# Patient Record
Sex: MALE | Race: WHITE | Employment: FULL TIME | URBAN - METROPOLITAN AREA
[De-identification: names, ages, dates, MRNs, and addresses within clinical notes are randomized per-mention and may not be internally consistent; named-entity substitution may affect disease eponyms.]

---

## 2021-10-05 ENCOUNTER — APPOINTMENT (OUTPATIENT)
Dept: GENERAL RADIOLOGY | Age: 46
End: 2021-10-05
Attending: EMERGENCY MEDICINE
Payer: COMMERCIAL

## 2021-10-05 ENCOUNTER — HOSPITAL ENCOUNTER (EMERGENCY)
Age: 46
Discharge: HOME | End: 2021-10-05
Attending: EMERGENCY MEDICINE
Payer: COMMERCIAL

## 2021-10-05 VITALS
BODY MASS INDEX: 31.15 KG/M2 | HEIGHT: 72 IN | SYSTOLIC BLOOD PRESSURE: 136 MMHG | TEMPERATURE: 98 F | DIASTOLIC BLOOD PRESSURE: 86 MMHG | RESPIRATION RATE: 17 BRPM | WEIGHT: 230 LBS | OXYGEN SATURATION: 93 %

## 2021-10-05 DIAGNOSIS — S90.32XA CONTUSION OF LEFT FOOT, INITIAL ENCOUNTER: Primary | ICD-10-CM

## 2021-10-05 PROCEDURE — 74011250637 HC RX REV CODE- 250/637: Performed by: EMERGENCY MEDICINE

## 2021-10-05 PROCEDURE — 73630 X-RAY EXAM OF FOOT: CPT

## 2021-10-05 PROCEDURE — 99284 EMERGENCY DEPT VISIT MOD MDM: CPT

## 2021-10-05 RX ORDER — IBUPROFEN 400 MG/1
800 TABLET ORAL
Status: COMPLETED | OUTPATIENT
Start: 2021-10-05 | End: 2021-10-05

## 2021-10-05 RX ADMIN — IBUPROFEN 800 MG: 400 TABLET, FILM COATED ORAL at 20:48

## 2021-10-05 NOTE — ED TRIAGE NOTES
Left foot pain during takedown in [facility] another persons knee came into contact with left medial foot proximal to great toe

## 2021-10-06 NOTE — ED PROVIDER NOTES
History of chronic low back pain, GERD. He presents with left foot pain after injuring it while doing [email or web address]. He states his left medial foot came into contact with his opponent's knee approximately 1 hour ago. He has had difficulty weightbearing since then. The pain is moderate and worse with movement/weightbearing. Ice prior to arrival with limited relief. No other injuries or complaints. Past Medical History:   Diagnosis Date    Chronic pain     LOWER BACK    GERD (gastroesophageal reflux disease)     Other ill-defined conditions(799.89)     HEMORRHOIDS       Past Surgical History:   Procedure Laterality Date    HX HERNIA REPAIR      INGUINAL    HX LUMBAR LAMINECTOMY      HX UROLOGICAL      VARICOSECELE         Family History:   Problem Relation Age of Onset    Cancer Mother         LEUKIMIA AND NON-HODGKINS LYMPHOMA    Diabetes Father         BORDERLINE    Emphysema Maternal Grandmother     Heart Attack Maternal Grandmother     Heart Disease Maternal Grandmother     Heart Disease Maternal Grandfather     Diabetes Paternal Grandmother     Heart Disease Paternal Grandfather     Heart Surgery Paternal Grandfather        Social History     Socioeconomic History    Marital status:      Spouse name: Not on file    Number of children: Not on file    Years of education: Not on file    Highest education level: Not on file   Occupational History    Not on file   Tobacco Use    Smoking status: Former Smoker     Packs/day: 0.50     Years: 10.00     Pack years: 5.00     Quit date: 2006     Years since quittin.8    Smokeless tobacco: Never Used   Substance and Sexual Activity    Alcohol use:  Yes     Alcohol/week: 3.3 standard drinks     Types: 2 Glasses of wine, 2 Cans of beer per week    Drug use: Yes     Types: Marijuana     Comment: AVERAGE 1 X A DAY    Sexual activity: Not on file   Other Topics Concern    Not on file   Social History Narrative    Not on file     Social Determinants of Health     Financial Resource Strain:     Difficulty of Paying Living Expenses:    Food Insecurity:     Worried About Running Out of Food in the Last Year:     [address] in the Last Year:    Transportation Needs:     Lack of Transportation (Medical):  Lack of Transportation (Non-Medical):    Physical Activity:     Days of Exercise per Week:     Minutes of Exercise per Session:    Stress:     Feeling of Stress :    Social Connections:     Frequency of Communication with Friends and Family:     Frequency of Social Gatherings with Friends and Family:     Attends Faith Services:     Active Member of Clubs or Organizations:     Attends Club or Organization Meetings:     Marital Status:    Intimate Partner Violence:     Fear of Current or Ex-Partner:     Emotionally Abused:     Physically Abused:     Sexually Abused: ALLERGIES: Patient has no known allergies. Review of Systems   All other systems reviewed and are negative. Vitals:    10/05/21 1958   BP: (!) 141/86   Resp: 17   Temp: 98 °F (36.7 °C)   Weight: 104.3 kg (230 lb)   Height: 6' (1.829 m)            Physical Exam  Vitals and nursing note reviewed. Constitutional:       Appearance: He is well-developed. HENT:      Head: Normocephalic and atraumatic. Eyes:      Conjunctiva/sclera: Conjunctivae normal.   Neck:      Trachea: No tracheal deviation. Cardiovascular:      Rate and Rhythm: Normal rate. Pulmonary:      Effort: Pulmonary effort is normal.   Abdominal:      General: There is no distension. Musculoskeletal:      Comments: Left medial foot tenderness. 2+ pedal pulses. Skin:     General: Skin is dry. Neurological:      Mental Status: He is alert. MDM       Procedures    Progress Note:  Results, treatment, and follow up plan have been discussed with patient. Questions were answered.    Jabier Palacio MD  8:42 PM    Assessment/plan: Left foot injury -suspect contusion; reassuring appearance/exam with stable vital signs. X-rays negative for fracture. Home with Ace wrap/crutches. Recommendations of rest, ice, elevation, ibuprofen. Ortho follow-up as needed. Return precautions discussed.   Gokul Wagner MD  8:43 PM

## 2022-02-18 ENCOUNTER — OFFICE VISIT (OUTPATIENT)
Dept: ORTHOPEDIC SURGERY | Age: 47
End: 2022-02-18
Payer: COMMERCIAL

## 2022-02-18 VITALS — WEIGHT: 230 LBS | BODY MASS INDEX: 31.15 KG/M2 | HEIGHT: 72 IN

## 2022-02-18 DIAGNOSIS — M25.561 ACUTE PAIN OF RIGHT KNEE: Primary | ICD-10-CM

## 2022-02-18 DIAGNOSIS — S83.411A COMPLETE TEAR OF MCL OF KNEE, RIGHT, INITIAL ENCOUNTER: ICD-10-CM

## 2022-02-18 PROCEDURE — 99204 OFFICE O/P NEW MOD 45 MIN: CPT | Performed by: ORTHOPAEDIC SURGERY

## 2022-02-18 PROCEDURE — L1833 KO ADJ JNT POS R SUP PRE OTS: HCPCS | Performed by: ORTHOPAEDIC SURGERY

## 2022-02-18 RX ORDER — TRAMADOL HYDROCHLORIDE 50 MG/1
50 TABLET ORAL
Qty: 42 TABLET | Refills: 0 | Status: SHIPPED | OUTPATIENT
Start: 2022-02-18 | End: 2022-02-25

## 2022-02-18 NOTE — PROGRESS NOTES
ASSESSMENT/PLAN:  Below is the assessment and plan developed based on review of pertinent history, physical exam, labs, studies, and medications. 1. Acute pain of right knee  -     XR KNEE RT MIN 4 V; Future  2. Tear of medial meniscus of right knee, current, unspecified tear type, initial encounter      No follow-ups on file. 79-year-old male with symptoms of a right knee MCL tear. Due to the nature of his injury and his acute symptoms, I would like to obtain an MRI to further evaluate the pathology in his knee. He will follow up with us once the imaging is obtained. Given that the patient's symptoms are increasing in frequency and duration, we have decided to evaluate the etiology of the pain and loss of function with an MRI. We discussed the risks of an MRI which include, but are not limited to the enclosed space, noisy environment, magnetic effect on implanted metal. We also talked about the fact that MRI is also contraindicated in the presence of internal metallic objects such as bullets or shrapnel, as well as surgical clips, pins, plates, screws, metal sutures, or wire mesh. We talked about the fact that MRI does not use radiation, but it may be contraindicated if the patient has implanted pacemakers, intracranial aneurysm clips, cochlear implants, certain prosthetic devices, implanted drug infusion pumps, neurostimulators, bone-growth stimulators, certain intrauterine contraceptive devices; or any other type of iron-based metal implants. We discussed the fact that if you are pregnant or suspect that you may be pregnant, you should notify your physician and consult with your primary care or obstetrician before having an MRI. Although rare, we talked about the fact that if contrast dye is used, there is a risk for allergic reaction to the dye.  Patients who are allergic to or sensitive to medications, contrast dye, iodine, or shellfish should notify the radiologist or technologist prior to the administration of dye. MRI contrast may also influence other conditions such as allergies, asthma, anemia, hypotension (low blood pressure), and sickle cell disease. The patient has expressed understanding of these risks and I will see the patient back after the MRI to discuss the findings as well as the treatment options. SUBJECTIVE/OBJECTIVE:  Ellie Iqbal (: 1975) is a 51 y.o. male, patient,here for evaluation of the Knee Pain (right knee)  . 58-year-old male presenting today complaining of right knee pain. He states that last night he was doing jujitsu when he felt a pop in the medial aspect of his knee and immediate pain. He was able to bear weight following the injury with some discomfort. He did not notice much swelling in the joint. He has trouble fully extending his knee as well as fully flexing. His pain is worsened with bearing weight. He is currently ambulating with crutches. He denies any radiation of pain or any numbness or tingling in the leg. He is not taking any medication for his symptoms at this time. Physical Exam    Upon examination of the right knee, the patient is tender to palpation along the medial joint line and femoral origin of the MCL, and has some soft tissue swelling medially. There is a small effusion. The patient has discomfort with Catrachita´s maneuvers as well as stability testing, and I cannot elicit an endpoint to the MCL with valgus stress testing. They lack full motion secondary to discomfort and swelling. They have 5/5 strength, and are neurovascularly intact distally. There is no erythema, warmth or skin lesions present. Imaging:    A/P, lateral, tunnel and sunrise views of the right knee were reviewed in the office today and demonstrated no periosteal reaction, no medullary lesions, no osteopenia, well aligned joint spaces and no chondrolysis. No Known Allergies    No current outpatient medications on file.      No current facility-administered medications for this visit. Past Medical History:   Diagnosis Date    Chronic pain     LOWER BACK    GERD (gastroesophageal reflux disease)     Other ill-defined conditions(799.89)     HEMORRHOIDS       Past Surgical History:   Procedure Laterality Date    HX HERNIA REPAIR  1977    INGUINAL    HX LUMBAR LAMINECTOMY  2004    HX UROLOGICAL      VARICOSECELE       Family History   Problem Relation Age of Onset    Cancer Mother         LEUKIMIA AND NON-HODGKINS LYMPHOMA    Diabetes Father         BORDERLINE    Emphysema Maternal Grandmother     Heart Attack Maternal Grandmother     Heart Disease Maternal Grandmother     Heart Disease Maternal Grandfather     Diabetes Paternal Grandmother     Heart Disease Paternal Grandfather     Heart Surgery Paternal Grandfather        Social History     Socioeconomic History    Marital status:      Spouse name: Not on file    Number of children: Not on file    Years of education: Not on file    Highest education level: Not on file   Occupational History    Not on file   Tobacco Use    Smoking status: Former Smoker     Packs/day: 0.50     Years: 10.00     Pack years: 5.00     Quit date: 11/22/2006     Years since quitting: 15.2    Smokeless tobacco: Never Used   Vaping Use    Vaping Use: Never used   Substance and Sexual Activity    Alcohol use: Yes     Alcohol/week: 3.3 standard drinks     Types: 2 Glasses of wine, 2 Cans of beer per week    Drug use: Yes     Types: Marijuana     Comment: AVERAGE 1 X A DAY    Sexual activity: Not on file   Other Topics Concern    Not on file   Social History Narrative    Not on file     Social Determinants of Health     Financial Resource Strain:     Difficulty of Paying Living Expenses: Not on file   Food Insecurity:     Worried About Running Out of Food in the Last Year: Not on file    Kaylee of Food in the Last Year: Not on file   Transportation Needs:     Lack of Transportation (Medical):  Not on file  Lack of Transportation (Non-Medical): Not on file   Physical Activity:     Days of Exercise per Week: Not on file    Minutes of Exercise per Session: Not on file   Stress:     Feeling of Stress : Not on file   Social Connections:     Frequency of Communication with Friends and Family: Not on file    Frequency of Social Gatherings with Friends and Family: Not on file    Attends Islam Services: Not on file    Active Member of [address] [place] [address] or Organizations: Not on file    Attends Club or Organization Meetings: Not on file    Marital Status: Not on file   Intimate Partner Violence:     Fear of Current or Ex-Partner: Not on file    Emotionally Abused: Not on file    Physically Abused: Not on file    Sexually Abused: Not on file   Housing Stability:     Unable to Pay for Housing in the Last Year: Not on file    Number of [place] in the Last Year: Not on file    Unstable Housing in the Last Year: Not on file       Review of Systems    No flowsheet data found. Vitals:  Ht 6' (1.829 m)   Wt 230 lb (104.3 kg)   BMI 31.19 kg/m²    Body mass index is 31.19 kg/m². An electronic signature was used to authenticate this note.   -- Javi Victor PA-C

## 2022-03-01 PROBLEM — S83.411A COMPLETE TEAR OF MCL OF KNEE, RIGHT, INITIAL ENCOUNTER: Status: ACTIVE | Noted: 2022-03-01

## 2022-03-02 ENCOUNTER — OFFICE VISIT (OUTPATIENT)
Dept: ORTHOPEDIC SURGERY | Age: 47
End: 2022-03-02
Payer: COMMERCIAL

## 2022-03-02 VITALS — BODY MASS INDEX: 31.15 KG/M2 | HEIGHT: 72 IN | WEIGHT: 230 LBS

## 2022-03-02 DIAGNOSIS — S83.411A COMPLETE TEAR OF MCL OF KNEE, RIGHT, INITIAL ENCOUNTER: Primary | ICD-10-CM

## 2022-03-02 PROCEDURE — 99214 OFFICE O/P EST MOD 30 MIN: CPT | Performed by: ORTHOPAEDIC SURGERY

## 2022-03-02 NOTE — PROGRESS NOTES
ASSESSMENT/PLAN:  Below is the assessment and plan developed based on review of pertinent history, physical exam, labs, studies, and medications. 1. Complete tear of MCL of knee, right, initial encounter      Return in about 4 weeks (around 3/30/2022). In discussion with the patient, we considered the numerus possible diagnoses that could be contributing to their present symptoms. We also deliberated on the extensive management options that must be considered to treat their current condition. We reviewed their accessible prior medical records, diagnostic tests, and current health and employment information. We considered how these symptoms were affecting the patient´s activities of daily living as well as employment and fitness activities. The patient had various questions regarding the possible risks, benefits, complications, morbidity and mortality regarding their diagnosis and treatment options. The patients´ comorbidities were considered, and I advocated that they consider maximizing lifestyle modification through nutrition and exercise to aid in addressing their symptoms. Shared decision making yielded an understanding to move forward with conservation treatment preferences. The patient expressed understanding that if conservative management fails to alleviate the present symptoms they will return to office for re-evaluation and consideration of additional diagnostic tests and potential surgical options. In the interim, we have recommended ice, elevation, and anti-inflammatory medications along with a physician directed home exercise program. We discussed the risks and common side effects of anti-inflammatory medications and instructed the patient to discontinue the medication and contact us if they experienced any side effects. The patient was encouraged to discuss the possible side effects with their family physician or pharmacist prior to initiating any new medications.     We talked about the fact that I would recommend brace wear anytime he is ambulatory. We talked about safe exercises. We talked about the lengthy recovery of healing. I will see him back in 1 month's time. We talked to the fact that he had a proximal MCL tear that should heal with bracing without surgery. Imaging:    I independently reviewed the images and report. MRI KNEE RT WO CONT  Narrative: EXAM: MRI KNEE RT WO CONT    INDICATION: Pain    COMPARISON: Radiographs 2/18/2022    TECHNIQUE: Axial T2 fat-saturated and proton density fat-saturated; coronal T1  and proton density fat-saturated; and sagittal T2 fat-saturated, proton density  fat-saturated, and gradient echo MRI of the right knee . CONTRAST: None. FINDINGS: Bone marrow: Within normal limits. No acute fracture, dislocation, or  marrow replacing process. Joint fluid: Knee joint fluid volume within normal limits. Small Baker's cyst.  Moderately prominent subcutaneous edema at the knee, wrist anteriorly and  anteromedially. Collateral ligaments and posterior, lateral corner: Edema along the course of  the MCL with intact appearing superficial component and partly disrupted  proximal deep component. Fibular collateral ligament and coronal ligaments  appear intact. Medial meniscus: Intact. Lateral meniscus: Intact. ACL and PCL: Intact. Tendons: Intact. Muscles: Within normal limits. Patellofemoral alignment: No patellar subluxation/tilt. Trochlear groove is not  hypoplastic. TT-TG distance: Normal.    Articular cartilage: No chondral or osteochondral derangement demonstrated. Soft tissue mass: None. Impression: Proximal deep component tear of MCL. No meniscal, chondral, or other ligamentous  derangement demonstrated. ASSESSMENT/PLAN:  Below is the assessment and plan developed based on review of pertinent history, physical exam, labs, studies, and medications.     1. Acute pain of right knee  -     XR KNEE RT MIN 4 V; Future  2. Tear of medial meniscus of right knee, current, unspecified tear type, initial encounter      No follow-ups on file. 48-year-old male with symptoms of a right knee MCL tear. Due to the nature of his injury and his acute symptoms, I would like to obtain an MRI to further evaluate the pathology in his knee. He will follow up with us once the imaging is obtained. Given that the patient's symptoms are increasing in frequency and duration, we have decided to evaluate the etiology of the pain and loss of function with an MRI. We discussed the risks of an MRI which include, but are not limited to the enclosed space, noisy environment, magnetic effect on implanted metal. We also talked about the fact that MRI is also contraindicated in the presence of internal metallic objects such as bullets or shrapnel, as well as surgical clips, pins, plates, screws, metal sutures, or wire mesh. We talked about the fact that MRI does not use radiation, but it may be contraindicated if the patient has implanted pacemakers, intracranial aneurysm clips, cochlear implants, certain prosthetic devices, implanted drug infusion pumps, neurostimulators, bone-growth stimulators, certain intrauterine contraceptive devices; or any other type of iron-based metal implants. We discussed the fact that if you are pregnant or suspect that you may be pregnant, you should notify your physician and consult with your primary care or obstetrician before having an MRI. Although rare, we talked about the fact that if contrast dye is used, there is a risk for allergic reaction to the dye. Patients who are allergic to or sensitive to medications, contrast dye, iodine, or shellfish should notify the radiologist or technologist prior to the administration of dye. MRI contrast may also influence other conditions such as allergies, asthma, anemia, hypotension (low blood pressure), and sickle cell disease.  The patient has expressed understanding of these risks and I will see the patient back after the MRI to discuss the findings as well as the treatment options. SUBJECTIVE/OBJECTIVE:  Renelle Simmonds (: 1975) is a 51 y.o. male, patient,here for evaluation of the Knee Pain (right knee)  . 65-year-old male presenting today complaining of right knee pain. He states that last night he was doing jujitsu when he felt a pop in the medial aspect of his knee and immediate pain. He was able to bear weight following the injury with some discomfort. He did not notice much swelling in the joint. He has trouble fully extending his knee as well as fully flexing. His pain is worsened with bearing weight. He is currently ambulating with crutches. He denies any radiation of pain or any numbness or tingling in the leg. He is not taking any medication for his symptoms at this time. Physical Exam    Upon examination of the right knee, the patient is tender to palpation along the medial joint line and femoral origin of the MCL, and has some soft tissue swelling medially. There is a small effusion. The patient has discomfort with Catrachita´s maneuvers as well as stability testing, and I cannot elicit an endpoint to the MCL with valgus stress testing. They lack full motion secondary to discomfort and swelling. They have 5/5 strength, and are neurovascularly intact distally. There is no erythema, warmth or skin lesions present. Imaging:    A/P, lateral, tunnel and sunrise views of the right knee were reviewed in the office today and demonstrated no periosteal reaction, no medullary lesions, no osteopenia, well aligned joint spaces and no chondrolysis. No Known Allergies    No current outpatient medications on file. No current facility-administered medications for this visit.        Past Medical History:   Diagnosis Date    Chronic pain     LOWER BACK    GERD (gastroesophageal reflux disease)     Other ill-defined conditions(674.09)     HEMORRHOIDS Past Surgical History:   Procedure Laterality Date    HX HERNIA REPAIR  1977    INGUINAL    HX LUMBAR LAMINECTOMY  2004    HX UROLOGICAL      VARICOSECELE       Family History   Problem Relation Age of Onset    Cancer Mother         LEUKIMIA AND NON-HODGKINS LYMPHOMA    Diabetes Father         BORDERLINE    Emphysema Maternal Grandmother     Heart Attack Maternal Grandmother     Heart Disease Maternal Grandmother     Heart Disease Maternal Grandfather     Diabetes Paternal Grandmother     Heart Disease Paternal Grandfather     Heart Surgery Paternal Grandfather        Social History     Socioeconomic History    Marital status:      Spouse name: Not on file    Number of children: Not on file    Years of education: Not on file    Highest education level: Not on file   Occupational History    Not on file   Tobacco Use    Smoking status: Former Smoker     Packs/day: 0.50     Years: 10.00     Pack years: 5.00     Quit date: 11/22/2006     Years since quitting: 15.2    Smokeless tobacco: Never Used   Vaping Use    Vaping Use: Never used   Substance and Sexual Activity    Alcohol use: Yes     Alcohol/week: 3.3 standard drinks     Types: 2 Glasses of wine, 2 Cans of beer per week    Drug use: Yes     Types: Marijuana     Comment: AVERAGE 1 X A DAY    Sexual activity: Not on file   Other Topics Concern    Not on file   Social History Narrative    Not on file     Social Determinants of Health     Financial Resource Strain:     Difficulty of Paying Living Expenses: Not on file   Food Insecurity:     Worried About Running Out of Food in the Last Year: Not on file    Kaylee of Food in the Last Year: Not on file   Transportation Needs:     Lack of Transportation (Medical): Not on file    Lack of Transportation (Non-Medical):  Not on file   Physical Activity:     Days of Exercise per Week: Not on file    Minutes of Exercise per Session: Not on file   Stress:     Feeling of Stress : Not on file   Social Connections:     Frequency of Communication with Friends and Family: Not on file    Frequency of Social Gatherings with Friends and Family: Not on file    Attends Bahai Services: Not on file    Active Member of Clubs or Organizations: Not on file    Attends Club or Organization Meetings: Not on file    Marital Status: Not on file   Intimate Partner Violence:     Fear of Current or Ex-Partner: Not on file    Emotionally Abused: Not on file    Physically Abused: Not on file    Sexually Abused: Not on file   Housing Stability:     Unable to Pay for Housing in the Last Year: Not on file    Number of [place] in the Last Year: Not on file    Unstable Housing in the Last Year: Not on file       Review of Systems    No flowsheet data found. Vitals:  Ht 6' (1.829 m)   Wt 230 lb (104.3 kg)   BMI 31.19 kg/m²    Body mass index is 31.19 kg/m². An electronic signature was used to authenticate this note.   -- Reynaldo Cabello PA-C   .

## 2022-04-05 NOTE — PROGRESS NOTES
ASSESSMENT/PLAN:  Below is the assessment and plan developed based on review of pertinent history, physical exam, labs, studies, and medications. 1. Complete tear of MCL of knee, right, initial encounter      Return in about 4 weeks (around 3/30/2022). In discussion with the patient, we considered the numerus possible diagnoses that could be contributing to their present symptoms. We also deliberated on the extensive management options that must be considered to treat their current condition. We reviewed their accessible prior medical records, diagnostic tests, and current health and employment information. We considered how these symptoms were affecting the patient´s activities of daily living as well as employment and fitness activities. The patient had various questions regarding the possible risks, benefits, complications, morbidity and mortality regarding their diagnosis and treatment options. The patients´ comorbidities were considered, and I advocated that they consider maximizing lifestyle modification through nutrition and exercise to aid in addressing their symptoms. Shared decision making yielded an understanding to move forward with conservation treatment preferences. The patient expressed understanding that if conservative management fails to alleviate the present symptoms they will return to office for re-evaluation and consideration of additional diagnostic tests and potential surgical options. In the interim, we have recommended ice, elevation, and anti-inflammatory medications along with a physician directed home exercise program. We discussed the risks and common side effects of anti-inflammatory medications and instructed the patient to discontinue the medication and contact us if they experienced any side effects. The patient was encouraged to discuss the possible side effects with their family physician or pharmacist prior to initiating any new medications.     Given that the patient's symptoms are increasing in frequency and duration we have decided to prescribe physical therapy. We talked about the fact that the goal of physical therapy is for the therapist to assist in developing a program to help return the patient to full strength, function and mobility and decrease pain. We also discussed that the therapist may combine several techniques to help decrease pain. These include but are not limited to stretching, balance exercises, strength training, massage, cold and heat therapy, and electrical stimulation. Although, physical therapy is generally safe, we went over the potential risks to include the worsening of pre-existing conditions, continued pain and no improvement in flexibility, mobility, and strength. We will have the patient follow up after physical therapy to closely monitor their progress. We talked about following up sooner if therapy is not progressing on a weekly basis. We talked about the fact that his MCL was healing well but he need to work on extension of his knee. We will start some physical therapy. I will see him back in 1 month's time. Imaging:    I independently reviewed the images and report. MRI KNEE RT WO CONT  Narrative: EXAM: MRI KNEE RT WO CONT    INDICATION: Pain    COMPARISON: Radiographs 2/18/2022    TECHNIQUE: Axial T2 fat-saturated and proton density fat-saturated; coronal T1  and proton density fat-saturated; and sagittal T2 fat-saturated, proton density  fat-saturated, and gradient echo MRI of the right knee . CONTRAST: None. FINDINGS: Bone marrow: Within normal limits. No acute fracture, dislocation, or  marrow replacing process. Joint fluid: Knee joint fluid volume within normal limits. Small Baker's cyst.  Moderately prominent subcutaneous edema at the knee, wrist anteriorly and  anteromedially.      Collateral ligaments and posterior, lateral corner: Edema along the course of  the MCL with intact appearing superficial component and partly disrupted  proximal deep component. Fibular collateral ligament and coronal ligaments  appear intact. Medial meniscus: Intact. Lateral meniscus: Intact. ACL and PCL: Intact. Tendons: Intact. Muscles: Within normal limits. Patellofemoral alignment: No patellar subluxation/tilt. Trochlear groove is not  hypoplastic. TT-TG distance: Normal.    Articular cartilage: No chondral or osteochondral derangement demonstrated. Soft tissue mass: None. Impression: Proximal deep component tear of MCL. No meniscal, chondral, or other ligamentous  derangement demonstrated. ASSESSMENT/PLAN:  Below is the assessment and plan developed based on review of pertinent history, physical exam, labs, studies, and medications. 1. Acute pain of right knee  -     XR KNEE RT MIN 4 V; Future  2. Tear of medial meniscus of right knee, current, unspecified tear type, initial encounter      No follow-ups on file. 58-year-old male with symptoms of a right knee MCL tear. Due to the nature of his injury and his acute symptoms, I would like to obtain an MRI to further evaluate the pathology in his knee. He will follow up with us once the imaging is obtained. Given that the patient's symptoms are increasing in frequency and duration, we have decided to evaluate the etiology of the pain and loss of function with an MRI. We discussed the risks of an MRI which include, but are not limited to the enclosed space, noisy environment, magnetic effect on implanted metal. We also talked about the fact that MRI is also contraindicated in the presence of internal metallic objects such as bullets or shrapnel, as well as surgical clips, pins, plates, screws, metal sutures, or wire mesh.  We talked about the fact that MRI does not use radiation, but it may be contraindicated if the patient has implanted pacemakers, intracranial aneurysm clips, cochlear implants, certain prosthetic devices, implanted drug infusion pumps, neurostimulators, bone-growth stimulators, certain intrauterine contraceptive devices; or any other type of iron-based metal implants. We discussed the fact that if you are pregnant or suspect that you may be pregnant, you should notify your physician and consult with your primary care or obstetrician before having an MRI. Although rare, we talked about the fact that if contrast dye is used, there is a risk for allergic reaction to the dye. Patients who are allergic to or sensitive to medications, contrast dye, iodine, or shellfish should notify the radiologist or technologist prior to the administration of dye. MRI contrast may also influence other conditions such as allergies, asthma, anemia, hypotension (low blood pressure), and sickle cell disease. The patient has expressed understanding of these risks and I will see the patient back after the MRI to discuss the findings as well as the treatment options. SUBJECTIVE/OBJECTIVE:  Phyllis Woody (: 1975) is a 51 y.o. male, patient,here for evaluation of the Knee Pain (right knee)  . 54-year-old male presenting today complaining of right knee pain. He states that last night he was doing jujitsu when he felt a pop in the medial aspect of his knee and immediate pain. He was able to bear weight following the injury with some discomfort. He did not notice much swelling in the joint. He has trouble fully extending his knee as well as fully flexing. His pain is worsened with bearing weight. He is currently ambulating with crutches. He denies any radiation of pain or any numbness or tingling in the leg. He is not taking any medication for his symptoms at this time. Physical Exam    Upon examination of the right knee, the patient is tender to palpation along the medial joint line and femoral origin of the MCL, and has some soft tissue swelling medially. There is a small effusion.  The patient has discomfort with Catrachita´s maneuvers as well as stability testing, and I cannot elicit an endpoint to the MCL with valgus stress testing. They lack full motion secondary to discomfort and swelling. They have 5/5 strength, and are neurovascularly intact distally. There is no erythema, warmth or skin lesions present. Imaging:    A/P, lateral, tunnel and sunrise views of the right knee were reviewed in the office today and demonstrated no periosteal reaction, no medullary lesions, no osteopenia, well aligned joint spaces and no chondrolysis. No Known Allergies    No current outpatient medications on file. No current facility-administered medications for this visit.        Past Medical History:   Diagnosis Date    Chronic pain     LOWER BACK    GERD (gastroesophageal reflux disease)     Other ill-defined conditions(799.89)     HEMORRHOIDS       Past Surgical History:   Procedure Laterality Date    HX HERNIA REPAIR  1977    INGUINAL    HX LUMBAR LAMINECTOMY  2004    HX UROLOGICAL      VARICOSECELE       Family History   Problem Relation Age of Onset    Cancer Mother         LEUKIMIA AND NON-HODGKINS LYMPHOMA    Diabetes Father         BORDERLINE    Emphysema Maternal Grandmother     Heart Attack Maternal Grandmother     Heart Disease Maternal Grandmother     Heart Disease Maternal Grandfather     Diabetes Paternal Grandmother     Heart Disease Paternal Grandfather     Heart Surgery Paternal Grandfather        Social History     Socioeconomic History    Marital status:      Spouse name: Not on file    Number of children: Not on file    Years of education: Not on file    Highest education level: Not on file   Occupational History    Not on file   Tobacco Use    Smoking status: Former Smoker     Packs/day: 0.50     Years: 10.00     Pack years: 5.00     Quit date: 11/22/2006     Years since quitting: 15.2    Smokeless tobacco: Never Used   Vaping Use    Vaping Use: Never used   Substance and Sexual Activity    Alcohol use: Yes     Alcohol/week: 3.3 standard drinks     Types: 2 Glasses of wine, 2 Cans of beer per week    Drug use: Yes     Types: Marijuana     Comment: AVERAGE 1 X A DAY    Sexual activity: Not on file   Other Topics Concern    Not on file   Social History Narrative    Not on file     Social Determinants of Health     Financial Resource Strain:     Difficulty of Paying Living Expenses: Not on file   Food Insecurity:     Worried About Running Out of Food in the Last Year: Not on file    Kaylee of Food in the Last Year: Not on file   Transportation Needs:     Lack of Transportation (Medical): Not on file    Lack of Transportation (Non-Medical): Not on file   Physical Activity:     Days of Exercise per Week: Not on file    Minutes of Exercise per Session: Not on file   Stress:     Feeling of Stress : Not on file   Social Connections:     Frequency of Communication with Friends and Family: Not on file    Frequency of Social Gatherings with Friends and Family: Not on file    Attends Voodoo Services: Not on file    Active Member of [address] [place] [address] or Organizations: Not on file    Attends Club or Organization Meetings: Not on file    Marital Status: Not on file   Intimate Partner Violence:     Fear of Current or Ex-Partner: Not on file    Emotionally Abused: Not on file    Physically Abused: Not on file    Sexually Abused: Not on file   Housing Stability:     Unable to Pay for Housing in the Last Year: Not on file    Number of [place] in the Last Year: Not on file    Unstable Housing in the Last Year: Not on file       Review of Systems    No flowsheet data found. Vitals:  Ht 6' (1.829 m)   Wt 230 lb (104.3 kg)   BMI 31.19 kg/m²    Body mass index is 31.19 kg/m². An electronic signature was used to authenticate this note.   -- Yanira Bennett PA-C   .

## 2022-04-06 ENCOUNTER — OFFICE VISIT (OUTPATIENT)
Dept: ORTHOPEDIC SURGERY | Age: 47
End: 2022-04-06
Payer: COMMERCIAL

## 2022-04-06 VITALS — BODY MASS INDEX: 31.15 KG/M2 | HEIGHT: 72 IN | WEIGHT: 230 LBS

## 2022-04-06 DIAGNOSIS — S83.411A COMPLETE TEAR OF MCL OF KNEE, RIGHT, INITIAL ENCOUNTER: Primary | ICD-10-CM

## 2022-04-06 DIAGNOSIS — M24.662 ARTHROFIBROSIS OF KNEE JOINT, LEFT: ICD-10-CM

## 2022-04-06 PROCEDURE — 99213 OFFICE O/P EST LOW 20 MIN: CPT | Performed by: ORTHOPAEDIC SURGERY

## 2022-04-12 ENCOUNTER — OFFICE VISIT (OUTPATIENT)
Dept: ORTHOPEDIC SURGERY | Age: 47
End: 2022-04-12
Payer: COMMERCIAL

## 2022-04-12 DIAGNOSIS — M25.561 ACUTE PAIN OF RIGHT KNEE: Primary | ICD-10-CM

## 2022-04-12 DIAGNOSIS — M24.662 ARTHROFIBROSIS OF KNEE JOINT, LEFT: ICD-10-CM

## 2022-04-12 PROCEDURE — 97110 THERAPEUTIC EXERCISES: CPT | Performed by: PHYSICAL THERAPIST

## 2022-04-12 PROCEDURE — 97035 APP MDLTY 1+ULTRASOUND EA 15: CPT | Performed by: PHYSICAL THERAPIST

## 2022-04-12 PROCEDURE — 97161 PT EVAL LOW COMPLEX 20 MIN: CPT | Performed by: PHYSICAL THERAPIST

## 2022-04-12 NOTE — PROGRESS NOTES
Santos Thomas (: 1975) is a 51 y.o. Knee Pain       Patient Name: Santos Thomas  TZWR:  : 1975  [x]  Patient  Verified  Payor: Misael Guerrero / Plan: [employer] [place] [employer] / Product Type: HMO /    Albaro Levine MD  Total Treatment Time (min): 70  Total Timed Codes (min): 58  1:1 Treatment Time ([facility] only): N/A  Visit #: 1  60      Treatment Area: Right knee    ASSESSMENT/PLAN:  Below is the assessment and plan developed based on review of pertinent history, physical exam, labs, studies, and medications. Comes in today 2-month status post right MCL tear and presents with physical therapy deficits including range of motion, flexibility, strength, and proprioception. He will benefit from a physical therapy program to address above-mentioned deficits. Short-term goals: To become independent with today's prescribed home exercise program in 1 week. Long-term goals: To progress with a physical therapy program so the patient demonstrates terminal knee extension and may walk and jog without a limp, pain, or instability in the next 4 to 6 weeks. Additionally, patient will score clinically significant improvement of 20 points on the lower extremity functional scale in the next 4 to 6 weeks. Patient will be seen twice a week for up to 20 visits  with focus on progressive restoration of range of motion and strength, balance, and functional mobility. Therapeutic applications will include but are not limited to:Manual therapy, joint mobilization, myofascial release, therapeutic exercises. Modalities including ultrasound and electric stimulation heat and ice. Kinesiotape and Moss taping for joint reeducation and approximation of tissue for neuromuscular reeducation. 1. Acute pain of right knee  2. Arthrofibrosis of knee joint, left      Return in about 5 days (around 2022). SUBJECTIVE:  HPI  Patient reports injuring his right knee practicing mark on 2022.   He felt a pop in his knee. MRI reveals complete rupture of MCL at femur. He has improved in the past several weeks. He is now able to get around without significant pain or instability. He is having trouble with getting his knee straight. He wears a knee brace with higher level activities. He has been riding his Peloton bike 4-5 times a week. He would like to return to [facility]. He also works out at [employer] and has access to exercise equipment. Past medical history includes prior lumbar laminectomy with some residual atrophy to his left leg. See patient's medical chart for detail list of current medications as well as significant past medical history    OBJECTIVE:  Evaluation (20 minutes)  Comes in today demonstrating slight antalgic gait second 2 lack of terminal knee extension on the right side. Relative difficulty with single-leg balance on right lower extremity. Right knee: Active extension limited by 15 degrees. Passive extension limited by 5 degrees. No appreciable knee joint effusion. Patient is tender at [facility] femoral condyle insertion and along proximal half of MCL. Relative hypomobility to patellofemoral joint. Flexibility restriction to quadriceps, gastroc, and hamstring. Knee is stable with valgus stress testing. Negative with Lachman's test, posterior drawer, and Nelson's test.  She has a diminished quadriceps contraction. Calf is nontender. Pulses and sensation are intact throughout right lower extremity. Extremity functional scale: 55/8    Manual    Modalities  US-(mins 8) 2 proximal MCL at 50% duty cycle and 2.0 MHz. Ice posttreatment in extension prop. Exercise(mins 30)  Days interventions we initiated exercises for flexibility, range of motion, and strengthening for patient to perform at home on a daily basis. Days exercises include shuttle heel raise, dynamic calf stretch, gastroc stretch, treadmill, TKE, and hamstring stretch.   An electronic signature was used to authenticate this note.   -- Robert Gonzales, PT

## 2022-04-18 ENCOUNTER — OFFICE VISIT (OUTPATIENT)
Dept: NEUROLOGY | Age: 47
End: 2022-04-18
Payer: COMMERCIAL

## 2022-04-18 VITALS
DIASTOLIC BLOOD PRESSURE: 78 MMHG | BODY MASS INDEX: 31.15 KG/M2 | HEIGHT: 72 IN | SYSTOLIC BLOOD PRESSURE: 110 MMHG | WEIGHT: 230 LBS | RESPIRATION RATE: 20 BRPM

## 2022-04-18 DIAGNOSIS — H81.10 BENIGN PAROXYSMAL POSITIONAL VERTIGO, UNSPECIFIED LATERALITY: Primary | ICD-10-CM

## 2022-04-18 DIAGNOSIS — G43.009 MIGRAINE WITHOUT AURA AND WITHOUT STATUS MIGRAINOSUS, NOT INTRACTABLE: ICD-10-CM

## 2022-04-18 PROCEDURE — 99204 OFFICE O/P NEW MOD 45 MIN: CPT | Performed by: SPECIALIST

## 2022-04-18 NOTE — PROGRESS NOTES
Noticing it 1 year ago, 2021  Flat on his back and tilt his head backwards he would get dizzy  [place] like he was flipped backwards   He went to an ENT- the crystals were fine   This past summer it came back- went back to the ENT crystals were still fine   November/December it came back   Wouldn't go away this time, the ENT still checked him out   Gets migraines randomly

## 2022-04-18 NOTE — PATIENT INSTRUCTIONS
Patient history noted and exam accomplished. Migraines at this time are inconsequential but if desires further follow-up that could be arranged. On the positional vertigo will refer to Dr. Raphael Mckoy and I think there is a better than average chance this is a case of benign paroxysmal positional vertigo. Good luck and hopefully an Epley maneuver could accomplish a good end and lasting one.
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
## 2022-04-19 ENCOUNTER — OFFICE VISIT (OUTPATIENT)
Dept: ORTHOPEDIC SURGERY | Age: 47
End: 2022-04-19
Payer: COMMERCIAL

## 2022-04-19 DIAGNOSIS — S83.411A COMPLETE TEAR OF MCL OF KNEE, RIGHT, INITIAL ENCOUNTER: ICD-10-CM

## 2022-04-19 DIAGNOSIS — M25.561 ACUTE PAIN OF RIGHT KNEE: Primary | ICD-10-CM

## 2022-04-19 PROCEDURE — 97110 THERAPEUTIC EXERCISES: CPT

## 2022-04-19 PROCEDURE — 97140 MANUAL THERAPY 1/> REGIONS: CPT

## 2022-04-19 NOTE — Clinical Note
Description Code Dx Service Date Service Prov Modifiers Qty Status               AK THERAPEUTIC EXERCISES 28617 CPT®  04/19/2022 Deana Randhawa, PTA GP 3 Pend    AK MANUAL THER TECH,1+REGIONS,EA 15 MIN 84392 CPT®  04/19/2022 Deana Randhawa PTA GP 1 Pend

## 2022-04-19 NOTE — PROGRESS NOTES
Tad Stager (: 1975) is a 51 y.o. Knee Pain       Patient Name: Tad Stageosvaldo  QJDS:[id number]  : 1975  [x]  Patient  Verified  Payor: Andres Krishna / Plan: Vonda Singh PPO / Product Type: PPO /    Yamini Bowles MD  Total Treatment Time (min): 60  Total Timed Codes (min): 60  1:1 Treatment Time ([address] only): N/A  Visit #: 2 of 60      Treatment Area: Right knee    ASSESSMENT/PLAN:  Below is the assessment and plan developed based on review of pertinent history, physical exam, labs, studies, and medications. Patient is doing well 8 wks post MCL tear and is completing a routine HEP (I). Has limited TKE and balance deficits do to instability in right knee. Patient feeling cautious of re-injury, edcation given to continue with low impact activity until at least week 12. Continue per plan of care. 1. Acute pain of right knee  2. Complete tear of MCL of knee, right, initial encounter      Return in about 1 week (around 2022) for continue therapy for knee pain. SUBJECTIVE:  HPI    Patient reports no change. He is riding his peloton, walking 3+ miles, and doing UE strength training at the gym. Has not tried to perform [place]rella since his last attempt feeling unsteady and sore after. He would like to get back to mountain biking and has a trip planned for May but is cautious about re injury. States he does ice when needed and has a mild limp in the mornings. OBJECTIVE:      Manual: STM to medial hamstring and quad, Gentle PROM to knee with patellar glides, Patella mobs. (15 Mins )      Modalities  US-(mins 6) 2 proximal MCL at 50% duty cycle and 2.0 MHz.       Exercise(mins 45)      PT Exercise Log         Activity/Exercise Date  22     Activity/Exercise  All exercises were supervised with verbal and tactile cues provided were necessary to perform the exercises with 100% accuracy       Bike [x] 6 Min      Calf stretch [x] Balance board  Dynamic     Shuttle heel raise     [x]    Shuttle quad     []    TKE   [x]    Single-leg stance   [x]   Rebounder   Hamstring S   [x] Band     Short arc quad   []    Treadmill   []    Cup Walk   []    Abduction walk   []      Straight leg raise []        Quad stretch []    Stools scoot []    Balance board []    Step downs   []      Lower* []    Bungee walk   []    Fitter   []    Elliptical     []      Knee flexion stretch                    []                              []                            []         An electronic signature was used to authenticate this note.   -- Co-treated by JOSE CRUZ Molina

## 2022-04-22 ENCOUNTER — OFFICE VISIT (OUTPATIENT)
Dept: ORTHOPEDIC SURGERY | Age: 47
End: 2022-04-22
Payer: COMMERCIAL

## 2022-04-22 DIAGNOSIS — M24.662 ARTHROFIBROSIS OF KNEE JOINT, LEFT: ICD-10-CM

## 2022-04-22 DIAGNOSIS — M25.561 ACUTE PAIN OF RIGHT KNEE: Primary | ICD-10-CM

## 2022-04-22 PROCEDURE — 97110 THERAPEUTIC EXERCISES: CPT

## 2022-04-22 PROCEDURE — 97140 MANUAL THERAPY 1/> REGIONS: CPT

## 2022-04-22 NOTE — Clinical Note
CAPTURE PREF LIST FILTER)      Description Code Dx Service Date Service Prov Modifiers Qty Status               [place] THERAPEUTIC EXERCISES 72710 CPT®  04/22/2022 Jay James, PTA GP 3 Pend    MN MANUAL THER TECH,1+REGIONS,EA 15 MIN 78323 CPT®  04/22/2022 Jay James PTA GP 1 Pend

## 2022-04-22 NOTE — PROGRESS NOTES
Kd Nelson (: 1975) is a 51 y.o. Knee Pain       Patient Name: Kd Nelson  Date:2022  : 1975  [x]  Patient  Verified  Payor: Ghislaine Gambino / Plan: Nora Hoover PPO / Product Type: PPO /    Princess Garsia MD  Total Treatment Time (min): 60  Total Timed Codes (min): 60  1:1 Treatment Time ( [address] only): N/A  Visit #: 3 of 60      Treatment Area: Right knee    ASSESSMENT/PLAN:  Below is the assessment and plan developed based on review of pertinent history, physical exam, labs, studies, and medications. 1. Acute pain of right knee  2. Arthrofibrosis of knee joint, left    Patient making progress with less tenderness to touch on medial joint line. Continues to be limited with terminal knee extension and has a limp due to stiffness after sedentary activity. Educated patient to perform LAQ at desk to decrease stiffness during the work day. Continue with current plan of care and progress towards long-term goals to increase. Return in about 4 days (around 2022) for continued therapy for knee. SUBJECTIVE:  Knee Pain      Patient continues to do his exercise at home. C/o stiffness in the morning with a mild limp and after sitting for 30-60 min's at his desk. Reports that he felt fine after therapy with no increase in pain after increased exercises. OBJECTIVE:      Manual: STM to medial hamstring and quad, Gentle PROM to knee with patellar glides, Patella mobs. LLPS in prone. (15 Mins )      Modalities  US-(mins 6) 2 proximal MCL at 50% duty cycle and 2.0 MHz.       Exercise(mins 45)      PT Exercise Log         Activity/Exercise Date  22     Activity/Exercise  All exercises were supervised with verbal and tactile cues provided were necessary to perform the exercises with 100% accuracy       Bike [x] 6 Min      Calf stretch [x] Balance board  Dynamic     Shuttle heel raise     [x]    Shuttle quad     []    TKE   [x]    Single-leg stance   [x]   Rebounder   Hamstring S   [x] Band     Short arc quad   []    Treadmill   []    Cup Walk   []    Bungy Retro [x]    Star [x] SLS       Quad stretch []    Stools scoot []    Balance board []    Step downs   []      Lower* []    Bungee walk   []    Fitter   []    Elliptical     []      Knee flexion stretch                    []                              []                            []         An electronic signature was used to authenticate this note.   -- Co-treated by JOSE CRUZ Cardenas

## 2022-04-26 ENCOUNTER — OFFICE VISIT (OUTPATIENT)
Dept: ORTHOPEDIC SURGERY | Age: 47
End: 2022-04-26
Payer: COMMERCIAL

## 2022-04-26 DIAGNOSIS — M25.561 ACUTE PAIN OF RIGHT KNEE: Primary | ICD-10-CM

## 2022-04-26 DIAGNOSIS — M24.662 ARTHROFIBROSIS OF KNEE JOINT, LEFT: ICD-10-CM

## 2022-04-26 PROCEDURE — 97140 MANUAL THERAPY 1/> REGIONS: CPT

## 2022-04-26 PROCEDURE — 97110 THERAPEUTIC EXERCISES: CPT

## 2022-04-26 NOTE — PROGRESS NOTES
Maryann White (: 1975) is a 51 y.o. Knee Pain       Patient Name: Maryann White  Date:2022  : 1975  [x]  Patient  Verified  Payor: Gokul Summers / Plan: Nettie Redmond PPO / Product Type: PPO /    Jann Gomez MD  Total Treatment Time (min): 60  Total Timed Codes (min): 60  1:1 Treatment Time ( [address] only): N/A  Visit #: 4 of 60      Treatment Area: Right knee    ASSESSMENT/PLAN:  Below is the assessment and plan developed based on review of pertinent history, physical exam, labs, studies, and medications. 1. Acute pain of right knee  2. Arthrofibrosis of knee joint, left    Patient is doing well tolerating uneven surfaces with dynamic balance activities this session. No pain with lateral lunges and reports getting back to recreational activities with no increase in pain. Continues to be limited with TKE on right compared to left side, with tightness at end range. Continue with current plan of care and progress towards long-term goals to increase. Return in about 3 days (around 2022) for continued therapy for knee. SUBJECTIVE:  Knee Pain      Patient completed yard work sat, mowing the lawn. Stated that he went mountain biking, 10 miles,  at [facility]. Reports no increase in pain, edema, or stiffness in the morning. OBJECTIVE:      Manual: STM to medial hamstring and quad, Gentle PROM to knee with patellar glides, Patella mobs. LLPS in prone. (15 Mins )    ROM Extension: 22              R  2 degrees prom            L  10 degrees prom, 5 degrees with overpressure    Modalities  US-(mins 6) 2 proximal MCL at 50% duty cycle and 2.0 MHz.     K-Tape to [facility]    Exercise(mins 45)      PT Exercise Log         Activity/Exercise Date  22     Activity/Exercise  All exercises were supervised with verbal and tactile cues provided were necessary to perform the exercises with 100% accuracy       Bike [x] 6 Min      Calf stretch [x] Balance board  Dynamic     Shuttle heel raise     [x]    Shuttle quad     []    Quad Set   [x]    Single-leg stance   [x]   Rebounder with foam   Hamstring S   [x] Band  TKE from JOSE CRUZ     Short arc quad   []    Treadmill   []    Cup Walk   []    Bungy Retro [x]    Star [x] SLS       Quad stretch []    Stools scoot []    Balance board []    Step downs   []      Lower* []    Bungee walk retro [x]    Side lunges    [x] 3x15 B   Elliptical     []      Knee flexion stretch                    []                              []                            []         An electronic signature was used to authenticate this note.   -- Co-treated by JOSE CRUZ Miranda

## 2022-04-26 NOTE — Clinical Note
CAPTURE PREF LIST FILTER)      Description Code Dx Service Date Service Prov Modifiers Qty Status               MD THERAPEUTIC EXERCISES 19024 CPT®  04/26/2022 Kang Sandoval PTA GP 3 Pend    MD MANUAL THER TECH,1+REGIONS,EA 15 MIN 53583 CPT®  04/26/2022 Kang Sandoval PTA GP 1 Pend

## 2022-05-03 NOTE — PROGRESS NOTES
ASSESSMENT/PLAN:  Below is the assessment and plan developed based on review of pertinent history, physical exam, labs, studies, and medications. 1. Complete tear of MCL of knee, right, initial encounter      Return in about 4 weeks (around 3/30/2022). In discussion with the patient, we considered the numerus possible diagnoses that could be contributing to their present symptoms. We also deliberated on the extensive management options that must be considered to treat their current condition. We reviewed their accessible prior medical records, diagnostic tests, and current health and employment information. We considered how these symptoms were affecting the patient´s activities of daily living as well as employment and fitness activities. The patient had various questions regarding the possible risks, benefits, complications, morbidity and mortality regarding their diagnosis and treatment options. The patients´ comorbidities were considered, and I advocated that they consider maximizing lifestyle modification through nutrition and exercise to aid in addressing their symptoms. Shared decision making yielded an understanding to move forward with conservation treatment preferences. The patient expressed understanding that if conservative management fails to alleviate the present symptoms they will return to office for re-evaluation and consideration of additional diagnostic tests and potential surgical options. In the interim, we have recommended ice, elevation, and anti-inflammatory medications along with a physician directed home exercise program. We discussed the risks and common side effects of anti-inflammatory medications and instructed the patient to discontinue the medication and contact us if they experienced any side effects. The patient was encouraged to discuss the possible side effects with their family physician or pharmacist prior to initiating any new medications.     Given that the patient's symptoms are increasing in frequency and duration we have decided to prescribe physical therapy. We talked about the fact that the goal of physical therapy is for the therapist to assist in developing a program to help return the patient to full strength, function and mobility and decrease pain. We also discussed that the therapist may combine several techniques to help decrease pain. These include but are not limited to stretching, balance exercises, strength training, massage, cold and heat therapy, and electrical stimulation. Although, physical therapy is generally safe, we went over the potential risks to include the worsening of pre-existing conditions, continued pain and no improvement in flexibility, mobility, and strength. We will have the patient follow up after physical therapy to closely monitor their progress. We talked about following up sooner if therapy is not progressing on a weekly basis. After a long discussion regarding treatment options, we have decided to prescribe an oral medication. We discussed the risks and common side effects of the medication and instructed the patient to discontinue the medication and contact us if they experienced any side effects. We also encouraged the patient to discuss the possible side effects with their family physician or pharmacist prior to initiating any new medications. Continue to focus on regaining extension. We will try a steroid pack for his biceps tendinitis on his left elbow. I will see him back in 1 month's time. Imaging:    I independently reviewed the images and report. MRI KNEE RT WO CONT  Narrative: EXAM: MRI KNEE RT WO CONT    INDICATION: Pain    COMPARISON: Radiographs 2/18/2022    TECHNIQUE: Axial T2 fat-saturated and proton density fat-saturated; coronal T1  and proton density fat-saturated; and sagittal T2 fat-saturated, proton density  fat-saturated, and gradient echo MRI of the right knee . CONTRAST: None. FINDINGS: Bone marrow: Within normal limits. No acute fracture, dislocation, or  marrow replacing process. Joint fluid: Knee joint fluid volume within normal limits. Small Baker's cyst.  Moderately prominent subcutaneous edema at the knee, wrist anteriorly and  anteromedially. Collateral ligaments and posterior, lateral corner: Edema along the course of  the MCL with intact appearing superficial component and partly disrupted  proximal deep component. Fibular collateral ligament and coronal ligaments  appear intact. Medial meniscus: Intact. Lateral meniscus: Intact. ACL and PCL: Intact. Tendons: Intact. Muscles: Within normal limits. Patellofemoral alignment: No patellar subluxation/tilt. Trochlear groove is not  hypoplastic. TT-TG distance: Normal.    Articular cartilage: No chondral or osteochondral derangement demonstrated. Soft tissue mass: None. Impression: Proximal deep component tear of MCL. No meniscal, chondral, or other ligamentous  derangement demonstrated. SUBJECTIVE/OBJECTIVE:  Latoya Young (: 1975) is a 51 y.o. male, patient,here for evaluation of the Knee Pain (right knee)  . 74-year-old male presenting today complaining of right knee pain. He states that last night he was doing jujitsu when he felt a pop in the medial aspect of his knee and immediate pain. He was able to bear weight following the injury with some discomfort. He did not notice much swelling in the joint. He has trouble fully extending his knee as well as fully flexing. His pain is worsened with bearing weight. He is currently ambulating with crutches. He denies any radiation of pain or any numbness or tingling in the leg. He is not taking any medication for his symptoms at this time. He is also having some biceps symptoms.     Physical Exam    Upon examination of the right knee, the patient is tender to palpation along the medial joint line and femoral origin of the MCL, and has some soft tissue swelling medially. There is a small effusion. The patient has discomfort with Catrachita´s maneuvers as well as stability testing, and I cannot elicit an endpoint to the MCL with valgus stress testing. They lack full motion secondary to discomfort and swelling. They have 5/5 strength, and are neurovascularly intact distally. There is no erythema, warmth or skin lesions present. No Known Allergies    No current outpatient medications on file. No current facility-administered medications for this visit. Past Medical History:   Diagnosis Date    Chronic pain     LOWER BACK    GERD (gastroesophageal reflux disease)     Other ill-defined conditions(799.89)     HEMORRHOIDS       Past Surgical History:   Procedure Laterality Date    HX HERNIA REPAIR  1977    INGUINAL    HX LUMBAR LAMINECTOMY  2004    HX UROLOGICAL      VARICOSECELE       Family History   Problem Relation Age of Onset    Cancer Mother         LEUKIMIA AND NON-HODGKINS LYMPHOMA    Diabetes Father         BORDERLINE    Emphysema Maternal Grandmother     Heart Attack Maternal Grandmother     Heart Disease Maternal Grandmother     Heart Disease Maternal Grandfather     Diabetes Paternal Grandmother     Heart Disease Paternal Grandfather     Heart Surgery Paternal Grandfather        Social History     Socioeconomic History    Marital status:      Spouse name: Not on file    Number of children: Not on file    Years of education: Not on file    Highest education level: Not on file   Occupational History    Not on file   Tobacco Use    Smoking status: Former Smoker     Packs/day: 0.50     Years: 10.00     Pack years: 5.00     Quit date: 11/22/2006     Years since quitting: 15.2    Smokeless tobacco: Never Used   Vaping Use    Vaping Use: Never used   Substance and Sexual Activity    Alcohol use:  Yes     Alcohol/week: 3.3 standard drinks     Types: 2 Glasses of wine, 2 Cans of beer per week    Drug use: Yes     Types: Marijuana     Comment: AVERAGE 1 X A DAY    Sexual activity: Not on file   Other Topics Concern    Not on file   Social History Narrative    Not on file     Social Determinants of Health     Financial Resource Strain:     Difficulty of Paying Living Expenses: Not on file   Food Insecurity:     Worried About Running Out of Food in the Last Year: Not on file    Kaylee of Food in the Last Year: Not on file   Transportation Needs:     Lack of Transportation (Medical): Not on file    Lack of Transportation (Non-Medical): Not on file   Physical Activity:     Days of Exercise per Week: Not on file    Minutes of Exercise per Session: Not on file   Stress:     Feeling of Stress : Not on file   Social Connections:     Frequency of Communication with Friends and Family: Not on file    Frequency of Social Gatherings with Friends and Family: Not on file    Attends Orthodox Services: Not on file    Active Member of [address] [place] [address] or Organizations: Not on file    Attends Club or Organization Meetings: Not on file    Marital Status: Not on file   Intimate Partner Violence:     Fear of Current or Ex-Partner: Not on file    Emotionally Abused: Not on file    Physically Abused: Not on file    Sexually Abused: Not on file   Housing Stability:     Unable to Pay for Housing in the Last Year: Not on file    Number of [place] in the Last Year: Not on file    Unstable Housing in the Last Year: Not on file       Review of Systems    No flowsheet data found. Vitals:  Ht 6' (1.829 m)   Wt 230 lb (104.3 kg)   BMI 31.19 kg/m²    Body mass index is 31.19 kg/m². An electronic signature was used to authenticate this note. -- Dr. Palmira Leigh   .

## 2022-05-04 ENCOUNTER — OFFICE VISIT (OUTPATIENT)
Dept: ORTHOPEDIC SURGERY | Age: 47
End: 2022-05-04
Payer: COMMERCIAL

## 2022-05-04 VITALS — HEIGHT: 72 IN | BODY MASS INDEX: 31.15 KG/M2 | WEIGHT: 230 LBS

## 2022-05-04 DIAGNOSIS — S83.411A COMPLETE TEAR OF MCL OF KNEE, RIGHT, INITIAL ENCOUNTER: Primary | ICD-10-CM

## 2022-05-04 PROCEDURE — 99214 OFFICE O/P EST MOD 30 MIN: CPT | Performed by: ORTHOPAEDIC SURGERY

## 2022-05-04 RX ORDER — METHYLPREDNISOLONE 4 MG/1
4 TABLET ORAL
Qty: 1 DOSE PACK | Refills: 0 | Status: SHIPPED | OUTPATIENT
Start: 2022-05-04